# Patient Record
Sex: FEMALE | Race: WHITE
[De-identification: names, ages, dates, MRNs, and addresses within clinical notes are randomized per-mention and may not be internally consistent; named-entity substitution may affect disease eponyms.]

---

## 2017-04-24 ENCOUNTER — HOSPITAL ENCOUNTER (OUTPATIENT)
Dept: HOSPITAL 47 - RADXRMAIN | Age: 37
Discharge: HOME | End: 2017-04-24
Payer: MEDICARE

## 2017-04-24 DIAGNOSIS — M25.561: Primary | ICD-10-CM

## 2017-04-24 NOTE — XR
EXAMINATION TYPE: XR knee limited RT

 

DATE OF EXAM: 4/24/2017 12:53 PM

 

CLINICAL HISTORY: Chronic right knee pain since slip and fall injury a few months ago.

 

TECHNIQUE: 2 views of the right knee are obtained. 

 

COMPARISON: None.

 

FINDINGS:  There is no acute fracture/dislocation evident in right knee. Mild to moderate joint space
 loss medial tibiofemoral and patellofemoral compartments is present.  No significant spurring is see
n. The overlying soft tissue appears unremarkable.

 

IMPRESSION:  There is no acute or displaced subacute fracture or dislocation in the right knee.

## 2018-12-04 ENCOUNTER — HOSPITAL ENCOUNTER (OUTPATIENT)
Dept: HOSPITAL 47 - NEUROMAIN | Age: 38
Discharge: HOME | End: 2018-12-04
Attending: FAMILY MEDICINE
Payer: MEDICARE

## 2018-12-04 DIAGNOSIS — R41.82: ICD-10-CM

## 2018-12-04 DIAGNOSIS — R94.01: Primary | ICD-10-CM

## 2018-12-04 PROCEDURE — 95816 EEG AWAKE AND DROWSY: CPT

## 2018-12-07 NOTE — EEG
ELECTROENCEPHALOGRAM REPORT



DATE OF OUTPATIENT [id number]



ELECTROENCEPHALOGRAPHIC EXAMINATION REPORT:



INDICATION FOR EXAMINATION:

This patient is a 38-year-old female with history of shivering and seizure-like

activity.  Patient has a history of cognitive impairment and multiple surgeries at

[facility].



[age]:  [age].



EEG FINDINGS:

A routine 21-channel awake digital EEG recording was accomplished utilizing the 10-20

international system with bipolar and referential montages.  The background activity in

the most alert resting state consists of a low to medium amplitude, fairly well

developed and well sustained 8 Hz activity over the posterior head region.  This

posterior rhythm attenuates to eye opening.  There is a small amount of low amplitude

18-20 Hz beta activity seen maximally over the anterior head regions.  Muscle and

movement artifact was observed on several occasions throughout the tracing.



Hyperventilation failed to add any additional information to the tracing.  No further

activation was noted.  Photic stimulation at flash frequencies of 2-30 Hz produced a

good symmetrical occipital driving response.



The main feature of this tracing is the occurrence on several occasions of generalized

sharp wave activity.  There are also some bitemporal sharp waves noted occasionally

during this tracing.



IMPRESSION:

This EEG is abnormal due to the finding of generalized sharp wave activity and

bitemporal sharp waves.  This finding raises suspicion for possible seizure disorder of

deep level origin.  Clinical correlation is strongly recommended.





MMTHADDEUS / JONELN: [id number] / Job#: [id number]

## 2019-01-10 ENCOUNTER — HOSPITAL ENCOUNTER (OUTPATIENT)
Dept: HOSPITAL 47 - RADMRIMAIN | Age: 39
End: 2019-01-10
Attending: PSYCHIATRY & NEUROLOGY
Payer: MEDICARE

## 2019-01-10 DIAGNOSIS — R94.02: Primary | ICD-10-CM

## 2019-01-10 LAB — BUN SERPL-SCNC: 17 MG/DL (ref 7–17)

## 2019-01-10 PROCEDURE — 70553 MRI BRAIN STEM W/O & W/DYE: CPT

## 2019-01-10 PROCEDURE — 84520 ASSAY OF UREA NITROGEN: CPT

## 2019-01-10 PROCEDURE — 82565 ASSAY OF CREATININE: CPT

## 2019-01-10 PROCEDURE — 36415 COLL VENOUS BLD VENIPUNCTURE: CPT

## 2019-01-10 NOTE — MR
EXAMINATION TYPE: MR brain wo/w con

 

DATE OF EXAM: 1/10/2019

 

COMPARISON: NONE

 

HISTORY: Localization-related epilepsy, Static encephalopathy

 

TECHNIQUE: 

Multiplanar, multisequence images of the brain and brainstem is performed without and with IV contras
t, utilizing 9.5 mL intravenous Gadavist .

 

FINDINGS: Diffusion weighted images demonstrate no evidence of a recent infarct or other diffusion ab
normality.  There is no extra-axial fluid collection or significant white matter signal abnormality. 
 The ventricular system and cisternal spaces are normal in size and appearance.  The brain volume is 
age appropriate. T2 coronal weighted images show hippocampal gyri are symmetric and felt within palmer
l limits. There is symmetric bilaterally elongation of the superior cerebellar peduncles seen best on
 axial image 12. There is also small dysplastic cerebellar vermis seen posterior to this.

 

The craniocervical junction appears within normal limits.  Post contrast images demonstrate no abnorm
al enhancement. The dural venous sinuses appear patent. The visualized sinuses are clear and the glob
es are intact.

 

IMPRESSION: Molar tooth sign with small dysplastic cerebellar vermis correlate to exclude Hannah syn
drome but can also be seen in other conditions such as nephronophthisis, hepatic fibrosis, and in Cog
an's syndrome. Clinical correlation advised.

## 2020-07-01 ENCOUNTER — HOSPITAL ENCOUNTER (OUTPATIENT)
Dept: HOSPITAL 47 - RADUSWWP | Age: 40
Discharge: HOME | End: 2020-07-01
Attending: FAMILY MEDICINE
Payer: MEDICARE

## 2020-07-01 DIAGNOSIS — N85.8: Primary | ICD-10-CM

## 2020-07-01 DIAGNOSIS — N83.202: ICD-10-CM

## 2020-07-01 PROCEDURE — 76856 US EXAM PELVIC COMPLETE: CPT

## 2020-07-02 NOTE — US
EXAMINATION TYPE: US pelvic complete

 

DATE OF EXAM: 7/1/2020

 

COMPARISON: NONE

 

CLINICAL HISTORY: 39-year-old female N94.6 Dysmenorrhea.

 

TECHNIQUE:  Transabdominal (TA).  

 

Sonographer notes: Static encephalopathy, no transvaginal attempted.

 

FINDINGS:

 

Date of LMP:  06/24/20

 

EXAM MEASUREMENTS:

 

Uterus:  8.2 x 3.8 x 5.4 cm

Endometrial Stripe: 0.4 cm

Right Ovary:  3.5 x 2.4 x 1.8 cm

Left Ovary:  3.5 x 2.2 x 2.1 cm

 

 

 

1. Uterus:  Anteverted, heterogeneous myometrium 

2. Endometrium:  wnl

3. Right Ovary:  wnl

4. Left Ovary:  simple appearing cyst measuring 2.2 x 1.7 x 1.8cm

5. Bilateral Adnexa:  wnl

6. Posterior cul-de-sac:  wnl

 

 

 

IMPRESSION: 

1. Heterogeneous myometrium may be seen with diffuse small fibroid change or underlying adenomyosis.

2. A 2.2 cm dominant follicle or functional cyst in the left ovary.

## 2024-12-11 ENCOUNTER — HOSPITAL ENCOUNTER (OUTPATIENT)
Dept: HOSPITAL 47 - RADXRMAIN | Age: 44
Discharge: HOME | End: 2024-12-11
Attending: FAMILY MEDICINE
Payer: MEDICARE

## 2024-12-11 DIAGNOSIS — M48.02: Primary | ICD-10-CM

## 2024-12-11 DIAGNOSIS — M25.512: ICD-10-CM

## 2024-12-11 DIAGNOSIS — M50.10: ICD-10-CM

## 2024-12-11 PROCEDURE — 72050 X-RAY EXAM NECK SPINE 4/5VWS: CPT

## 2024-12-11 NOTE — XR
EXAMINATION TYPE: XR shoulder complete LT

 

DATE OF EXAM: 12/11/2024 4:50 PM

 

COMPARISON: None. 

 

CLINICAL INDICATION: Female, 44 years old with history of  M25.512 PAIN LFT SHOUL,

 

TECHNIQUE: XR shoulder complete LT view(s) obtained.

 

FINDINGS: 

The humeral head articulates with the glenoid.

The acromio-clavicular junction is normal.

No acute fractures or dislocations are evident.

 

A follow up study can be performed 7-10 days from acute trauma for continued pain.  MRI can be perfor
med if soft tissue evaluation would be of benefit.

 

IMPRESSION:

1. No acute osseous shoulder abnormality. 

 

[facility], Workstation: [id number], 12/11/2024 6:48 PM

## 2024-12-11 NOTE — XR
EXAMINATION TYPE: XR cervical spine comp

 

DATE OF EXAM: 12/11/2024 4:50 PM

 

COMPARISON: None. 

 

CLINICAL INDICATION: Female, 44 years old with history of M54.12 CERVICLA REGION NECK,

 

TECHNIQUE: 6 view(s) obtained.

 

FINDINGS: 

There is narrowing of disc height at C3-4. Anterior vertebral body spurring is present C3-C7. Prevert
ebral space is normal. Posterior spinal lamellar line is intact.

 

Foraminal stenosis is present on the right C3-4 C4-5 C5-6 C6-7. Foraminal narrowing is mild narrowing
 on the left at C6-7.

 

IMPRESSION:

1.  Degenerative disc changes and spondylosis cervical spine.

2. Severe foraminal stenosis on the right C3-4 through C6-7.

 

[facility], Workstation: [id number], 12/11/2024 11:08 PM